# Patient Record
Sex: FEMALE | Race: WHITE | NOT HISPANIC OR LATINO | Employment: FULL TIME | URBAN - METROPOLITAN AREA
[De-identification: names, ages, dates, MRNs, and addresses within clinical notes are randomized per-mention and may not be internally consistent; named-entity substitution may affect disease eponyms.]

---

## 2023-08-04 LAB
ALANINE AMINOTRANSFERASE (SGPT) (U/L) IN SER/PLAS: 12 U/L (ref 7–45)
ALBUMIN (G/DL) IN SER/PLAS: 4 G/DL (ref 3.4–5)
ALKALINE PHOSPHATASE (U/L) IN SER/PLAS: 94 U/L (ref 33–110)
ANION GAP IN SER/PLAS: 12 MMOL/L (ref 10–20)
ASPARTATE AMINOTRANSFERASE (SGOT) (U/L) IN SER/PLAS: 11 U/L (ref 9–39)
BASOPHILS (10*3/UL) IN BLOOD BY AUTOMATED COUNT: 0.06 X10E9/L (ref 0–0.1)
BASOPHILS/100 LEUKOCYTES IN BLOOD BY AUTOMATED COUNT: 0.5 % (ref 0–2)
BILIRUBIN TOTAL (MG/DL) IN SER/PLAS: 0.3 MG/DL (ref 0–1.2)
CALCIUM (MG/DL) IN SER/PLAS: 9.2 MG/DL (ref 8.6–10.3)
CARBON DIOXIDE, TOTAL (MMOL/L) IN SER/PLAS: 29 MMOL/L (ref 21–32)
CHLORIDE (MMOL/L) IN SER/PLAS: 102 MMOL/L (ref 98–107)
CHOLESTEROL (MG/DL) IN SER/PLAS: 198 MG/DL (ref 0–199)
CHOLESTEROL IN HDL (MG/DL) IN SER/PLAS: 32.2 MG/DL
CHOLESTEROL/HDL RATIO: 6.1
CREATININE (MG/DL) IN SER/PLAS: 0.75 MG/DL (ref 0.5–1.05)
EOSINOPHILS (10*3/UL) IN BLOOD BY AUTOMATED COUNT: 0.28 X10E9/L (ref 0–0.7)
EOSINOPHILS/100 LEUKOCYTES IN BLOOD BY AUTOMATED COUNT: 2.2 % (ref 0–6)
ERYTHROCYTE DISTRIBUTION WIDTH (RATIO) BY AUTOMATED COUNT: 12.9 % (ref 11.5–14.5)
ERYTHROCYTE MEAN CORPUSCULAR HEMOGLOBIN CONCENTRATION (G/DL) BY AUTOMATED: 30.9 G/DL (ref 32–36)
ERYTHROCYTE MEAN CORPUSCULAR VOLUME (FL) BY AUTOMATED COUNT: 88 FL (ref 80–100)
ERYTHROCYTES (10*6/UL) IN BLOOD BY AUTOMATED COUNT: 5.02 X10E12/L (ref 4–5.2)
ESTIMATED AVERAGE GLUCOSE FOR HBA1C: 128 MG/DL
GFR FEMALE: >90 ML/MIN/1.73M2
GLUCOSE (MG/DL) IN SER/PLAS: 98 MG/DL (ref 74–99)
HEMATOCRIT (%) IN BLOOD BY AUTOMATED COUNT: 44 % (ref 36–46)
HEMOGLOBIN (G/DL) IN BLOOD: 13.6 G/DL (ref 12–16)
HEMOGLOBIN A1C/HEMOGLOBIN TOTAL IN BLOOD: 6.1 %
IMMATURE GRANULOCYTES/100 LEUKOCYTES IN BLOOD BY AUTOMATED COUNT: 0.4 % (ref 0–0.9)
LDL: 122 MG/DL (ref 0–99)
LEUKOCYTES (10*3/UL) IN BLOOD BY AUTOMATED COUNT: 12.9 X10E9/L (ref 4.4–11.3)
LYMPHOCYTES (10*3/UL) IN BLOOD BY AUTOMATED COUNT: 3.48 X10E9/L (ref 1.2–4.8)
LYMPHOCYTES/100 LEUKOCYTES IN BLOOD BY AUTOMATED COUNT: 27 % (ref 13–44)
MONOCYTES (10*3/UL) IN BLOOD BY AUTOMATED COUNT: 0.82 X10E9/L (ref 0.1–1)
MONOCYTES/100 LEUKOCYTES IN BLOOD BY AUTOMATED COUNT: 6.4 % (ref 2–10)
NEUTROPHILS (10*3/UL) IN BLOOD BY AUTOMATED COUNT: 8.2 X10E9/L (ref 1.2–7.7)
NEUTROPHILS/100 LEUKOCYTES IN BLOOD BY AUTOMATED COUNT: 63.5 % (ref 40–80)
NON HDL CHOLESTEROL: 166 MG/DL
PLATELETS (10*3/UL) IN BLOOD AUTOMATED COUNT: 454 X10E9/L (ref 150–450)
POTASSIUM (MMOL/L) IN SER/PLAS: 4.4 MMOL/L (ref 3.5–5.3)
PROTEIN TOTAL: 7.8 G/DL (ref 6.4–8.2)
SODIUM (MMOL/L) IN SER/PLAS: 139 MMOL/L (ref 136–145)
THYROTROPIN (MIU/L) IN SER/PLAS BY DETECTION LIMIT <= 0.05 MIU/L: 2.6 MIU/L (ref 0.44–3.98)
TRIGLYCERIDE (MG/DL) IN SER/PLAS: 221 MG/DL (ref 0–149)
UREA NITROGEN (MG/DL) IN SER/PLAS: 10 MG/DL (ref 6–23)
VLDL: 44 MG/DL (ref 0–40)

## 2023-12-11 ENCOUNTER — TELEPHONE (OUTPATIENT)
Dept: PRIMARY CARE | Facility: CLINIC | Age: 28
End: 2023-12-11
Payer: COMMERCIAL

## 2023-12-11 NOTE — TELEPHONE ENCOUNTER
Patient can't make it in for appt on 12/15 due to work and would like to switch appt to virtual if at all possible.

## 2023-12-15 ENCOUNTER — APPOINTMENT (OUTPATIENT)
Dept: PRIMARY CARE | Facility: CLINIC | Age: 28
End: 2023-12-15
Payer: COMMERCIAL

## 2024-01-29 ENCOUNTER — OFFICE VISIT (OUTPATIENT)
Dept: PRIMARY CARE | Facility: CLINIC | Age: 29
End: 2024-01-29
Payer: COMMERCIAL

## 2024-01-29 VITALS
TEMPERATURE: 98.4 F | HEIGHT: 62 IN | BODY MASS INDEX: 53.92 KG/M2 | DIASTOLIC BLOOD PRESSURE: 84 MMHG | SYSTOLIC BLOOD PRESSURE: 124 MMHG | WEIGHT: 293 LBS | HEART RATE: 111 BPM

## 2024-01-29 DIAGNOSIS — D72.829 LEUKOCYTOSIS, UNSPECIFIED TYPE: ICD-10-CM

## 2024-01-29 DIAGNOSIS — E66.01 CLASS 3 SEVERE OBESITY DUE TO EXCESS CALORIES WITH SERIOUS COMORBIDITY AND BODY MASS INDEX (BMI) OF 50.0 TO 59.9 IN ADULT (MULTI): ICD-10-CM

## 2024-01-29 DIAGNOSIS — E88.810 METABOLIC SYNDROME: ICD-10-CM

## 2024-01-29 DIAGNOSIS — R73.03 PRE-DIABETES: Primary | ICD-10-CM

## 2024-01-29 PROBLEM — E78.6 LOW LEVEL OF HIGH DENSITY LIPOPROTEIN (HDL): Status: ACTIVE | Noted: 2024-01-29

## 2024-01-29 PROBLEM — J30.9 ALLERGIC RHINITIS: Status: ACTIVE | Noted: 2024-01-29

## 2024-01-29 PROCEDURE — 1036F TOBACCO NON-USER: CPT | Performed by: FAMILY MEDICINE

## 2024-01-29 PROCEDURE — 99213 OFFICE O/P EST LOW 20 MIN: CPT | Performed by: FAMILY MEDICINE

## 2024-01-29 PROCEDURE — 3008F BODY MASS INDEX DOCD: CPT | Performed by: FAMILY MEDICINE

## 2024-01-29 RX ORDER — LEVONORGESTREL 52 MG/1
1 INTRAUTERINE DEVICE INTRAUTERINE ONCE
COMMUNITY

## 2024-01-29 ASSESSMENT — PATIENT HEALTH QUESTIONNAIRE - PHQ9
SUM OF ALL RESPONSES TO PHQ9 QUESTIONS 1 AND 2: 0
1. LITTLE INTEREST OR PLEASURE IN DOING THINGS: NOT AT ALL
2. FEELING DOWN, DEPRESSED OR HOPELESS: NOT AT ALL

## 2024-01-29 NOTE — PROGRESS NOTES
"Subjective   Patient ID: Muriel Carranza is a 28 y.o. female who presents for Follow-up (Follow up).    HPI   Here for follow-up and review of labs from last August.  In the past 1 month, has been making more concerted effort at healthier food choices, monitoring portions.  Getting back into exercise routine.  Motivated to make changes.  No recent BP checks outside of office.  Denies headaches, lightheadedness, dizziness.  Review of Systems   Constitutional:  Negative for fatigue and unexpected weight change.   Respiratory:  Negative for cough, chest tightness and shortness of breath.    Cardiovascular:  Negative for chest pain and palpitations.   Gastrointestinal:  Negative for abdominal pain.   Genitourinary:  Negative for menstrual problem.   Neurological:  Negative for dizziness, light-headedness and headaches.       Objective   /84   Pulse (!) 111   Temp 36.9 °C (98.4 °F)   Ht 1.571 m (5' 1.85\")   Wt 139 kg (305 lb 9.6 oz)   BMI 56.17 kg/m²     Physical Exam  Constitutional:       Appearance: Normal appearance. She is obese.   HENT:      Nose: Nose normal. No congestion or rhinorrhea.      Mouth/Throat:      Mouth: Mucous membranes are moist.      Pharynx: Oropharynx is clear.   Eyes:      Extraocular Movements: Extraocular movements intact.      Conjunctiva/sclera: Conjunctivae normal.      Pupils: Pupils are equal, round, and reactive to light.   Cardiovascular:      Rate and Rhythm: Normal rate and regular rhythm.      Pulses: Normal pulses.      Heart sounds: Normal heart sounds.   Pulmonary:      Effort: Pulmonary effort is normal.      Breath sounds: Normal breath sounds.   Abdominal:      General: Abdomen is flat.      Palpations: Abdomen is soft.      Tenderness: There is no abdominal tenderness.   Musculoskeletal:      Cervical back: Normal range of motion and neck supple.      Right lower leg: No edema.      Left lower leg: No edema.   Lymphadenopathy:      Cervical: No cervical " adenopathy.   Skin:     General: Skin is warm and dry.   Neurological:      Mental Status: She is alert.      2217  Hemoglobin A1C [[id number]]   (Abnormal)   Component Value Units   Hemoglobin A1C 6.1 Abnormal   %   Estimated Average Glucose 128 MG/DL   08/04/2023 2602073208/04/2023 1317Comprehensive Metabolic Panel [[id number]]  Component Value Units   Glucose 98 mg/dL   Sodium 139 mmol/L   Potassium 4.4 mmol/L   Chloride 102 mmol/L   Bicarbonate 29 mmol/L   Anion Gap 12 mmol/L   Urea Nitrogen 10 mg/dL   Creatinine 0.75 mg/dL   GFR Female >90  mL/min/1.73m2   Calcium 9.2 mg/dL   Albumin 4.0 g/dL   Alkaline Phosphatase 94 U/L   Total Protein 7.8 g/dL   AST 11 U/L   Total Bilirubin 0.3 mg/dL   ALT (SGPT) 12  U/L   08/04/2023 [id number]/04/2023 1317  Lipid Panel [[id number]]   (Abnormal)   Component Value Units   Cholesterol 198  mg/dL   HDL 32.2 Abnormal   mg/dL   Cholesterol/HDL Ratio 6.1 Abnormal       High   mg/dL   VLDL 44 High  mg/dL   Triglycerides 221 High   mg/dL   Non HDL Cholesterol 166  mg/dL       Assessment/Plan   Diagnoses and all orders for this visit:  Discussed lifestyle strategies to help improve weight management.  Check labs prior to follow-up in 6 months  Pre-diabetes  -     Basic Metabolic Panel; Future  -     Hemoglobin A1C; Future  Metabolic syndrome  Leukocytosis, unspecified type  -     CBC and Auto Differential; Future  Class 3 severe obesity due to excess calories with serious comorbidity and body mass index (BMI) of 50.0 to 59.9 in adult (CMS/[facility])

## 2024-01-30 PROBLEM — E66.01 CLASS 3 SEVERE OBESITY WITH BODY MASS INDEX (BMI) OF 50.0 TO 59.9 IN ADULT (MULTI): Status: ACTIVE | Noted: 2024-01-30

## 2024-01-30 PROBLEM — Z97.5 IUD CONTRACEPTION: Status: ACTIVE | Noted: 2024-01-30

## 2024-01-30 PROBLEM — E66.813 CLASS 3 SEVERE OBESITY WITH BODY MASS INDEX (BMI) OF 50.0 TO 59.9 IN ADULT: Status: ACTIVE | Noted: 2024-01-30

## 2024-01-30 ASSESSMENT — ENCOUNTER SYMPTOMS
ABDOMINAL PAIN: 0
PALPITATIONS: 0
HEADACHES: 0
DIZZINESS: 0
CHEST TIGHTNESS: 0
FATIGUE: 0
LIGHT-HEADEDNESS: 0
COUGH: 0
SHORTNESS OF BREATH: 0
UNEXPECTED WEIGHT CHANGE: 0

## 2024-04-02 ENCOUNTER — TELEMEDICINE (OUTPATIENT)
Dept: PRIMARY CARE | Facility: CLINIC | Age: 29
End: 2024-04-02
Payer: COMMERCIAL

## 2024-04-02 ENCOUNTER — TELEPHONE (OUTPATIENT)
Dept: PRIMARY CARE | Facility: CLINIC | Age: 29
End: 2024-04-02

## 2024-04-02 DIAGNOSIS — J01.90 ACUTE NON-RECURRENT SINUSITIS, UNSPECIFIED LOCATION: Primary | ICD-10-CM

## 2024-04-02 PROCEDURE — 3008F BODY MASS INDEX DOCD: CPT | Performed by: INTERNAL MEDICINE

## 2024-04-02 PROCEDURE — 99422 OL DIG E/M SVC 11-20 MIN: CPT | Performed by: INTERNAL MEDICINE

## 2024-04-02 PROCEDURE — 1036F TOBACCO NON-USER: CPT | Performed by: INTERNAL MEDICINE

## 2024-04-02 RX ORDER — AMOXICILLIN AND CLAVULANATE POTASSIUM 600; 42.9 MG/5ML; MG/5ML
600 POWDER, FOR SUSPENSION ORAL EVERY 12 HOURS SCHEDULED
Qty: 100 ML | Refills: 0 | Status: SHIPPED | OUTPATIENT
Start: 2024-04-02 | End: 2024-04-12

## 2024-04-02 RX ORDER — AMOXICILLIN AND CLAVULANATE POTASSIUM 875; 125 MG/1; MG/1
875 TABLET, FILM COATED ORAL 2 TIMES DAILY
Qty: 20 TABLET | Refills: 0 | Status: SHIPPED | OUTPATIENT
Start: 2024-04-02 | End: 2024-04-02 | Stop reason: ALTCHOICE

## 2024-04-02 ASSESSMENT — PATIENT HEALTH QUESTIONNAIRE - PHQ9
SUM OF ALL RESPONSES TO PHQ9 QUESTIONS 1 AND 2: 0
2. FEELING DOWN, DEPRESSED OR HOPELESS: NOT AT ALL
1. LITTLE INTEREST OR PLEASURE IN DOING THINGS: NOT AT ALL

## 2024-04-02 NOTE — PROGRESS NOTES
"Subjective   Patient ID: Muriel Carranza is a 28 y.o. female who presents for Nasal Congestion (Nasal congestion, \"scratchy\" throat, fevers x 4 days/).    HPI Pt is a pleasant 28 y.o. CF who was seen and evaluated during the VV. Pt states that she feels sick since 3/30/2024. Pt states that she has scratchy throat and congested sinuses as well as low grade fever as the initial sxs. Pt states that the fever broke on 4/1/2024 and now she has a lot of colored nasal discharge and sinuses pain and heaviness. Pt did home COVID test and it was negative for the infection.   During the clinical encounter pt denies fever, chills, no SOB, no chest pain/tightness, no abdominal pain, no N/V/D reported, no change of urination reported. Pt denies any other health concerns during this visit.  Sohx: reviewed  PMHx and Fhx: reviewed    Review of Systems  12 Systems have been reviewed as follows:   Constitutional: no chills  Eyes: no eyesight problems, no eye redness, no eye pain, no blurred vision  ENT: no ear pain, no hearing loss, but as mentioned at HPI  Cardiovascular: no chest pain or tightness, no SOB, the heart rate was not fast or slow  Respiratory: no cough, no dyspnea with exertion or with rest, no wheezing  Gastrointestinal: no abdominal pain, no constipation or diarrhea, no heartburn, no nausea or vomiting  Genitourinary: no urine frequency, no dysuria, no urinary urgency, no urinary incontinence or retention  Musculoskeletal: no back pain, no arthralgia, no joint swelling or stiffness, no muscle weakness  Integumentary: no skin lesions or rash  Neurological: no headaches, no dizziness or fainting, no limb weakness  Psychiatric: no mood changes, no anxiety or depression, no SI/HI  Endocrine: no weight change, no temperature intolerance, no change of appetite  Hematologic/Lymphatic: no easy bruising or bleeding  Objective   There were no vitals taken for this visit.    Physical Exam  PE was limited due to the virtual " settings of the visit  Pt appears comfortable, not in acute distress  Pt is Ax0x3, pleasant, follows the commands  Skin color is normal, clear conjunctiva  Breathing unlabored  No neck lymphadenopathy noticed   Vitals: were not provided    Assessment/Plan   Acute sinusitis:  Hx as mentioned above  Will start on amox/clav 875/125 mg PO BID x 10 days. Pt denies any chance of pregnancy  Pt could use tylenol for pain/fever management  Pt was instructed to contact PCP if pt will have no improvement of the condition/worsening of the sxs/new sxs on the current treatment  Plan was d/c with the pt in details, verbalized understanding, agreed  Please follow up with PCP as planned or sooner if needed     An interactive audio and video telecommunication system which permits real time communications between the patient (at the originating site) and provider (at the distant site) was utilized to provide this telehealth service.  Verbal consent was requested and obtained from the patient  during the telehealth visit.

## 2024-07-29 ENCOUNTER — LAB (OUTPATIENT)
Dept: LAB | Facility: LAB | Age: 29
End: 2024-07-29
Payer: COMMERCIAL

## 2024-07-29 DIAGNOSIS — D72.829 LEUKOCYTOSIS, UNSPECIFIED TYPE: ICD-10-CM

## 2024-07-29 DIAGNOSIS — R73.03 PRE-DIABETES: ICD-10-CM

## 2024-07-29 LAB
ANION GAP SERPL CALC-SCNC: 14 MMOL/L (ref 10–20)
BASOPHILS # BLD AUTO: 0.06 X10*3/UL (ref 0–0.1)
BASOPHILS NFR BLD AUTO: 0.4 %
BUN SERPL-MCNC: 11 MG/DL (ref 6–23)
CALCIUM SERPL-MCNC: 9.1 MG/DL (ref 8.6–10.3)
CHLORIDE SERPL-SCNC: 102 MMOL/L (ref 98–107)
CO2 SERPL-SCNC: 25 MMOL/L (ref 21–32)
CREAT SERPL-MCNC: 0.76 MG/DL (ref 0.5–1.05)
EGFRCR SERPLBLD CKD-EPI 2021: >90 ML/MIN/1.73M*2
EOSINOPHIL # BLD AUTO: 0.31 X10*3/UL (ref 0–0.7)
EOSINOPHIL NFR BLD AUTO: 2 %
ERYTHROCYTE [DISTWIDTH] IN BLOOD BY AUTOMATED COUNT: 13 % (ref 11.5–14.5)
GLUCOSE SERPL-MCNC: 86 MG/DL (ref 74–99)
HCT VFR BLD AUTO: 41.3 % (ref 36–46)
HGB BLD-MCNC: 12.9 G/DL (ref 12–16)
IMM GRANULOCYTES # BLD AUTO: 0.06 X10*3/UL (ref 0–0.7)
IMM GRANULOCYTES NFR BLD AUTO: 0.4 % (ref 0–0.9)
LYMPHOCYTES # BLD AUTO: 4.32 X10*3/UL (ref 1.2–4.8)
LYMPHOCYTES NFR BLD AUTO: 27.5 %
MCH RBC QN AUTO: 27 PG (ref 26–34)
MCHC RBC AUTO-ENTMCNC: 31.2 G/DL (ref 32–36)
MCV RBC AUTO: 87 FL (ref 80–100)
MONOCYTES # BLD AUTO: 1.31 X10*3/UL (ref 0.1–1)
MONOCYTES NFR BLD AUTO: 8.3 %
NEUTROPHILS # BLD AUTO: 9.65 X10*3/UL (ref 1.2–7.7)
NEUTROPHILS NFR BLD AUTO: 61.4 %
NRBC BLD-RTO: 0 /100 WBCS (ref 0–0)
PLATELET # BLD AUTO: 467 X10*3/UL (ref 150–450)
POTASSIUM SERPL-SCNC: 4.2 MMOL/L (ref 3.5–5.3)
RBC # BLD AUTO: 4.77 X10*6/UL (ref 4–5.2)
SODIUM SERPL-SCNC: 137 MMOL/L (ref 136–145)
WBC # BLD AUTO: 15.7 X10*3/UL (ref 4.4–11.3)

## 2024-07-29 PROCEDURE — 85025 COMPLETE CBC W/AUTO DIFF WBC: CPT

## 2024-07-29 PROCEDURE — 80048 BASIC METABOLIC PNL TOTAL CA: CPT

## 2024-07-29 PROCEDURE — 36415 COLL VENOUS BLD VENIPUNCTURE: CPT

## 2024-07-29 PROCEDURE — 83036 HEMOGLOBIN GLYCOSYLATED A1C: CPT

## 2024-07-30 LAB
EST. AVERAGE GLUCOSE BLD GHB EST-MCNC: 123 MG/DL
HBA1C MFR BLD: 5.9 %

## 2024-07-31 ENCOUNTER — APPOINTMENT (OUTPATIENT)
Dept: PRIMARY CARE | Facility: CLINIC | Age: 29
End: 2024-07-31
Payer: COMMERCIAL

## 2024-07-31 VITALS
SYSTOLIC BLOOD PRESSURE: 124 MMHG | HEART RATE: 92 BPM | WEIGHT: 293 LBS | HEIGHT: 61 IN | DIASTOLIC BLOOD PRESSURE: 82 MMHG | BODY MASS INDEX: 55.32 KG/M2 | TEMPERATURE: 98.6 F

## 2024-07-31 DIAGNOSIS — Z00.00 ADULT GENERAL MEDICAL EXAM: Primary | ICD-10-CM

## 2024-07-31 DIAGNOSIS — R73.03 PRE-DIABETES: ICD-10-CM

## 2024-07-31 DIAGNOSIS — D72.829 LEUKOCYTOSIS, UNSPECIFIED TYPE: ICD-10-CM

## 2024-07-31 DIAGNOSIS — E88.810 METABOLIC SYNDROME: ICD-10-CM

## 2024-07-31 DIAGNOSIS — E66.01 CLASS 3 SEVERE OBESITY DUE TO EXCESS CALORIES WITH SERIOUS COMORBIDITY AND BODY MASS INDEX (BMI) OF 50.0 TO 59.9 IN ADULT (MULTI): ICD-10-CM

## 2024-07-31 ASSESSMENT — ENCOUNTER SYMPTOMS
BLOOD IN STOOL: 0
UNEXPECTED WEIGHT CHANGE: 0
HEADACHES: 0
SHORTNESS OF BREATH: 0
NERVOUS/ANXIOUS: 0
FATIGUE: 0
COUGH: 0
PALPITATIONS: 0
DIZZINESS: 0
CONSTIPATION: 0
LIGHT-HEADEDNESS: 0
CHEST TIGHTNESS: 0
SLEEP DISTURBANCE: 0
ABDOMINAL PAIN: 0
DIARRHEA: 0
DYSPHORIC MOOD: 0

## 2024-07-31 ASSESSMENT — PATIENT HEALTH QUESTIONNAIRE - PHQ9
2. FEELING DOWN, DEPRESSED OR HOPELESS: NOT AT ALL
SUM OF ALL RESPONSES TO PHQ9 QUESTIONS 1 AND 2: 0
1. LITTLE INTEREST OR PLEASURE IN DOING THINGS: NOT AT ALL

## 2024-07-31 NOTE — PROGRESS NOTES
"Subjective   Patient ID: Muriel Carranza is a 29 y.o. female who presents for Follow-up (6m fuv - no questions/concerns at this time).    HPI   Here for annual well exam and follow-up and lab review.  Obesity-trying to improve diet.  Trying to decrease the frequency of meals that she eats away from home.  Has been doing meal prep to minimize takeout lunches.  Still consistent with exercise.  PreDM-denies any polyuria, polyphagia, polydipsia.  3.  Leukocytosis-denies any fevers, chills, night sweats.  No recent illness.  Needs to reschedule her hematology follow-up.    Nutrition: increasing veggies.  Still eating out, fast food, but trying to meal prep for lunches.  Water, no caffeine, occas lemonade  Exercise: 4-5 days per week, exercise video.   Sleep: no issues, 7-8 hrs. per night  Eye: 1 yr ago, glasses but looking into LASIK  Dental:  UTD  Has IUD in place.  Not planning on pregnancy in the near future.    Review of Systems   Constitutional:  Negative for fatigue and unexpected weight change.   Respiratory:  Negative for cough, chest tightness and shortness of breath.    Cardiovascular:  Negative for palpitations and leg swelling.   Gastrointestinal:  Negative for abdominal pain, blood in stool, constipation and diarrhea.   Genitourinary:  Negative for pelvic pain and vaginal bleeding.   Neurological:  Negative for dizziness, light-headedness and headaches.   Psychiatric/Behavioral:  Negative for dysphoric mood and sleep disturbance. The patient is not nervous/anxious.        Objective   /82 (BP Location: Left arm, Patient Position: Sitting, BP Cuff Size: Large adult)   Pulse 92   Temp 37 °C (98.6 °F)   Ht 1.549 m (5' 1\")   Wt 138 kg (304 lb)   BMI 57.44 kg/m²     Physical Exam  Vitals reviewed.   Constitutional:       General: She is not in acute distress.     Appearance: Normal appearance. She is obese.   HENT:      Head: Normocephalic and atraumatic.      Right Ear: Tympanic membrane and ear " canal normal.      Left Ear: Tympanic membrane and ear canal normal.      Nose: Nose normal. No congestion or rhinorrhea.      Mouth/Throat:      Mouth: Mucous membranes are moist.      Pharynx: Oropharynx is clear.   Eyes:      Extraocular Movements: Extraocular movements intact.      Conjunctiva/sclera: Conjunctivae normal.      Pupils: Pupils are equal, round, and reactive to light.   Cardiovascular:      Rate and Rhythm: Normal rate and regular rhythm.      Pulses: Normal pulses.      Heart sounds: Normal heart sounds. No murmur heard.  Pulmonary:      Effort: Pulmonary effort is normal. No respiratory distress.      Breath sounds: Normal breath sounds.   Abdominal:      General: Abdomen is flat. Bowel sounds are normal.      Palpations: Abdomen is soft.      Tenderness: There is no abdominal tenderness.   Musculoskeletal:         General: Normal range of motion.      Cervical back: Normal range of motion and neck supple.      Right lower leg: No edema.      Left lower leg: No edema.   Lymphadenopathy:      Cervical: No cervical adenopathy.   Skin:     General: Skin is warm and dry.   Neurological:      General: No focal deficit present.      Mental Status: She is alert and oriented to person, place, and time.   Psychiatric:         Mood and Affect: Mood normal.         Thought Content: Thought content normal.       Lab Results   Component Value Date    WBC 15.7 (H) 07/29/2024    HGB 12.9 07/29/2024    HCT 41.3 07/29/2024     (H) 07/29/2024    CHOL 198 08/04/2023    TRIG 221 (H) 08/04/2023    HDL 32.2 (A) 08/04/2023    ALT 12 08/04/2023    AST 11 08/04/2023     07/29/2024    K 4.2 07/29/2024     07/29/2024    CREATININE 0.76 07/29/2024    BUN 11 07/29/2024    CO2 25 07/29/2024    TSH 2.60 08/04/2023    HGBA1C 5.9 (H) 07/29/2024     par      Assessment/Plan   Diagnoses and all orders for this visit:  Adult general medical exam  Encourage continued efforts at improving diet, monitoring portions,  continue regular physical activity  Recommend seasonal flu vaccine and COVID booster this fall  Pre-diabetes  Discussed use of low-dose metformin to help with glucose metabolism and potential benefits and weight management.  She declined medication at present.  Continue to monitor A1c  -     Hemoglobin A1C; Future  -     Lipid Panel; Future  Metabolic syndrome  -     CBC and Auto Differential; Future  -     Comprehensive Metabolic Panel; Future  -     Lipid Panel; Future  Class 3 severe obesity due to excess calories with serious comorbidity and body mass index (BMI) of 50.0 to 59.9 in adult (Multi)  Discussed pharmaceutical options to help with weight management including but not limited to GLP-1 agonist therapy, metformin, Qsymia, Contrave.  We discussed saving plan programs for some these medications.  She does not wish to pursue pharmacotherapy at present nor does she wish to follow-up with nutrition at present.  Will continue on own trying to make diet lifestyle modifications.  Leukocytosis, unspecified type  Reviewed recent CBC, encouraged to reschedule her hematology follow-up for routine surveillance  Other orders  -     Follow Up In Primary Care - Established; Future

## 2024-08-13 ENCOUNTER — APPOINTMENT (OUTPATIENT)
Dept: PRIMARY CARE | Facility: CLINIC | Age: 29
End: 2024-08-13
Payer: COMMERCIAL

## 2024-08-23 ENCOUNTER — APPOINTMENT (OUTPATIENT)
Dept: HEMATOLOGY/ONCOLOGY | Facility: CLINIC | Age: 29
End: 2024-08-23

## 2024-09-25 DIAGNOSIS — D72.829 LEUKOCYTOSIS, UNSPECIFIED TYPE: ICD-10-CM

## 2024-09-27 ENCOUNTER — LAB (OUTPATIENT)
Dept: LAB | Facility: CLINIC | Age: 29
End: 2024-09-27
Payer: COMMERCIAL

## 2024-09-27 DIAGNOSIS — D72.829 LEUKOCYTOSIS, UNSPECIFIED TYPE: ICD-10-CM

## 2024-09-27 LAB
BASOPHILS # BLD AUTO: 0.05 X10*3/UL (ref 0–0.1)
BASOPHILS NFR BLD AUTO: 0.3 %
EOSINOPHIL # BLD AUTO: 0.26 X10*3/UL (ref 0–0.7)
EOSINOPHIL NFR BLD AUTO: 1.5 %
ERYTHROCYTE [DISTWIDTH] IN BLOOD BY AUTOMATED COUNT: 13.4 % (ref 11.5–14.5)
FERRITIN SERPL-MCNC: 94 NG/ML (ref 8–150)
HCT VFR BLD AUTO: 42.3 % (ref 36–46)
HGB BLD-MCNC: 13.3 G/DL (ref 12–16)
IMM GRANULOCYTES # BLD AUTO: 0.07 X10*3/UL (ref 0–0.7)
IMM GRANULOCYTES NFR BLD AUTO: 0.4 % (ref 0–0.9)
IRON SATN MFR SERPL: 13 % (ref 25–45)
IRON SERPL-MCNC: 42 UG/DL (ref 35–150)
LYMPHOCYTES # BLD AUTO: 4.5 X10*3/UL (ref 1.2–4.8)
LYMPHOCYTES NFR BLD AUTO: 25.4 %
MCH RBC QN AUTO: 27 PG (ref 26–34)
MCHC RBC AUTO-ENTMCNC: 31.4 G/DL (ref 32–36)
MCV RBC AUTO: 86 FL (ref 80–100)
MONOCYTES # BLD AUTO: 1.22 X10*3/UL (ref 0.1–1)
MONOCYTES NFR BLD AUTO: 6.9 %
NEUTROPHILS # BLD AUTO: 11.63 X10*3/UL (ref 1.2–7.7)
NEUTROPHILS NFR BLD AUTO: 65.5 %
PLATELET # BLD AUTO: 427 X10*3/UL (ref 150–450)
RBC # BLD AUTO: 4.92 X10*6/UL (ref 4–5.2)
TIBC SERPL-MCNC: 314 UG/DL (ref 240–445)
UIBC SERPL-MCNC: 272 UG/DL (ref 110–370)
WBC # BLD AUTO: 17.7 X10*3/UL (ref 4.4–11.3)

## 2024-09-27 PROCEDURE — 83540 ASSAY OF IRON: CPT

## 2024-09-27 PROCEDURE — 85025 COMPLETE CBC W/AUTO DIFF WBC: CPT

## 2024-09-27 PROCEDURE — 82728 ASSAY OF FERRITIN: CPT

## 2024-09-27 PROCEDURE — 36415 COLL VENOUS BLD VENIPUNCTURE: CPT

## 2024-10-01 ENCOUNTER — OFFICE VISIT (OUTPATIENT)
Dept: HEMATOLOGY/ONCOLOGY | Facility: CLINIC | Age: 29
End: 2024-10-01
Payer: COMMERCIAL

## 2024-10-01 VITALS
HEART RATE: 100 BPM | WEIGHT: 293 LBS | SYSTOLIC BLOOD PRESSURE: 157 MMHG | TEMPERATURE: 97.7 F | RESPIRATION RATE: 16 BRPM | OXYGEN SATURATION: 96 % | DIASTOLIC BLOOD PRESSURE: 87 MMHG | BODY MASS INDEX: 57.19 KG/M2

## 2024-10-01 DIAGNOSIS — R73.03 PRE-DIABETES: ICD-10-CM

## 2024-10-01 DIAGNOSIS — E66.813 CLASS 3 SEVERE OBESITY DUE TO EXCESS CALORIES WITH SERIOUS COMORBIDITY AND BODY MASS INDEX (BMI) OF 50.0 TO 59.9 IN ADULT: ICD-10-CM

## 2024-10-01 DIAGNOSIS — E66.01 CLASS 3 SEVERE OBESITY DUE TO EXCESS CALORIES WITH SERIOUS COMORBIDITY AND BODY MASS INDEX (BMI) OF 50.0 TO 59.9 IN ADULT: ICD-10-CM

## 2024-10-01 DIAGNOSIS — D72.829 LEUKOCYTOSIS, UNSPECIFIED TYPE: Primary | ICD-10-CM

## 2024-10-01 DIAGNOSIS — N92.4 EXCESSIVE BLEEDING IN PREMENOPAUSAL PERIOD: ICD-10-CM

## 2024-10-01 PROCEDURE — 99214 OFFICE O/P EST MOD 30 MIN: CPT | Performed by: INTERNAL MEDICINE

## 2024-10-01 ASSESSMENT — PAIN SCALES - GENERAL: PAINLEVEL: 0-NO PAIN

## 2024-10-06 PROBLEM — N92.4 EXCESSIVE BLEEDING IN PREMENOPAUSAL PERIOD: Status: ACTIVE | Noted: 2024-10-06

## 2024-10-06 ASSESSMENT — ENCOUNTER SYMPTOMS
MUSCULOSKELETAL NEGATIVE: 1
CONSTITUTIONAL NEGATIVE: 1
ENDOCRINE NEGATIVE: 1
RESPIRATORY NEGATIVE: 1
EYES NEGATIVE: 1
NEUROLOGICAL NEGATIVE: 1
PSYCHIATRIC NEGATIVE: 1
CARDIOVASCULAR NEGATIVE: 1
HEMATOLOGIC/LYMPHATIC NEGATIVE: 1
GASTROINTESTINAL NEGATIVE: 1

## 2024-10-06 NOTE — PROGRESS NOTES
Patient ID: Muriel Carranza is a 29 y.o. female.  Referring Physician: No referring provider defined for this encounter.  Primary Care Provider: Betty King MD  Visit Type: Follow Up      Subjective    HPI How are my blood counts?    Review of Systems   Constitutional: Negative.    HENT:  Negative.     Eyes: Negative.    Respiratory: Negative.     Cardiovascular: Negative.    Gastrointestinal: Negative.    Endocrine: Negative.    Genitourinary: Negative.     Musculoskeletal: Negative.    Skin: Negative.    Neurological: Negative.    Hematological: Negative.    Psychiatric/Behavioral: Negative.          Objective   BSA: 2.43 meters squared  /87   Pulse 100   Temp 36.5 °C (97.7 °F)   Resp 16   Wt 137 kg (302 lb 11.1 oz)   SpO2 96%   BMI 57.19 kg/m²      has a past medical history of Allergic (03/30/24) and Encounter for insertion of intrauterine contraceptive device.   has a past surgical history that includes Other surgical history (12/20/2021).  Family History   Problem Relation Name Age of Onset    Diabetes type II Mother Mckenzie Carranza     Hypertension Mother Mckenzie Carranza     Other (brain tumor) Father Amado Carranza     Hypertension Father Amado Carranza     Breast cancer Paternal Grandmother Crystal Carranza 70     Oncology History    No history exists.       Muriel Carranza  reports that she has never smoked. She has never used smokeless tobacco.  She  reports current alcohol use.  She  reports no history of drug use.    Physical Exam  Vitals reviewed.   Constitutional:       Appearance: Normal appearance.   HENT:      Head: Normocephalic.      Mouth/Throat:      Mouth: Mucous membranes are moist.   Eyes:      Extraocular Movements: Extraocular movements intact.      Pupils: Pupils are equal, round, and reactive to light.   Cardiovascular:      Rate and Rhythm: Normal rate and regular rhythm.      Pulses: Normal pulses.      Heart sounds: Normal heart sounds.   Pulmonary:       Effort: Pulmonary effort is normal.      Breath sounds: Normal breath sounds.   Abdominal:      General: Bowel sounds are normal.      Palpations: Abdomen is soft.   Musculoskeletal:         General: Normal range of motion.      Cervical back: Normal range of motion and neck supple.   Skin:     General: Skin is warm.   Neurological:      General: No focal deficit present.      Mental Status: She is alert and oriented to person, place, and time.   Psychiatric:         Mood and Affect: Mood normal.         Behavior: Behavior normal.         WBC   Date/Time Value Ref Range Status   09/27/2024 03:37 PM 17.7 (H) 4.4 - 11.3 x10*3/uL Final   07/29/2024 03:33 PM 15.7 (H) 4.4 - 11.3 x10*3/uL Final   08/04/2023 09:32 AM 12.9 (H) 4.4 - 11.3 x10E9/L Final   02/17/2023 03:02 PM 14.6 (H) 4.4 - 11.3 x10E9/L Final   07/15/2022 03:00 PM 16.0 (H) 4.4 - 11.3 x10E9/L Final     nRBC   Date Value Ref Range Status   07/29/2024 0.0 0.0 - 0.0 /100 WBCs Final   12/22/2021 0.0 0.0 - 0.0 /100 WBC Final     RBC   Date Value Ref Range Status   09/27/2024 4.92 4.00 - 5.20 x10*6/uL Final   07/29/2024 4.77 4.00 - 5.20 x10*6/uL Final   08/04/2023 5.02 4.00 - 5.20 x10E12/L Final   02/17/2023 4.72 4.00 - 5.20 x10E12/L Final   07/15/2022 4.71 4.00 - 5.20 x10E12/L Final     Hemoglobin   Date Value Ref Range Status   09/27/2024 13.3 12.0 - 16.0 g/dL Final   07/29/2024 12.9 12.0 - 16.0 g/dL Final   08/04/2023 13.6 12.0 - 16.0 g/dL Final   02/17/2023 13.1 12.0 - 16.0 g/dL Final   07/15/2022 13.2 12.0 - 16.0 g/dL Final     Hematocrit   Date Value Ref Range Status   09/27/2024 42.3 36.0 - 46.0 % Final   07/29/2024 41.3 36.0 - 46.0 % Final   08/04/2023 44.0 36.0 - 46.0 % Final   02/17/2023 41.4 36.0 - 46.0 % Final   07/15/2022 40.5 36.0 - 46.0 % Final     MCV   Date/Time Value Ref Range Status   09/27/2024 03:37 PM 86 80 - 100 fL Final   07/29/2024 03:33 PM 87 80 - 100 fL Final   08/04/2023 09:32 AM 88 80 - 100 fL Final   02/17/2023 03:02 PM 88 80 - 100 fL  "Final   07/15/2022 03:00 PM 86 80 - 100 fL Final     MCH   Date/Time Value Ref Range Status   09/27/2024 03:37 PM 27.0 26.0 - 34.0 pg Final   07/29/2024 03:33 PM 27.0 26.0 - 34.0 pg Final     MCHC   Date/Time Value Ref Range Status   09/27/2024 03:37 PM 31.4 (L) 32.0 - 36.0 g/dL Final   07/29/2024 03:33 PM 31.2 (L) 32.0 - 36.0 g/dL Final   08/04/2023 09:32 AM 30.9 (L) 32.0 - 36.0 g/dL Final   02/17/2023 03:02 PM 31.6 (L) 32.0 - 36.0 g/dL Final   07/15/2022 03:00 PM 32.6 32.0 - 36.0 g/dL Final     RDW   Date/Time Value Ref Range Status   09/27/2024 03:37 PM 13.4 11.5 - 14.5 % Final   07/29/2024 03:33 PM 13.0 11.5 - 14.5 % Final   08/04/2023 09:32 AM 12.9 11.5 - 14.5 % Final   02/17/2023 03:02 PM 13.0 11.5 - 14.5 % Final   07/15/2022 03:00 PM 12.9 11.5 - 14.5 % Final     Platelets   Date/Time Value Ref Range Status   09/27/2024 03:37  150 - 450 x10*3/uL Final   07/29/2024 03:33  (H) 150 - 450 x10*3/uL Final   08/04/2023 09:32  (H) 150 - 450 x10E9/L Final   02/17/2023 03:02  (H) 150 - 450 x10E9/L Final   07/15/2022 03:00  150 - 450 x10E9/L Final     No results found for: \"MPV\"  Neutrophils %   Date/Time Value Ref Range Status   09/27/2024 03:37 PM 65.5 40.0 - 80.0 % Final   07/29/2024 03:33 PM 61.4 40.0 - 80.0 % Final   08/04/2023 09:32 AM 63.5 40.0 - 80.0 % Final   02/17/2023 03:02 PM 61.3 40.0 - 80.0 % Final   07/15/2022 03:00 PM 63.6 40.0 - 80.0 % Final     Immature Granulocytes %, Automated   Date/Time Value Ref Range Status   09/27/2024 03:37 PM 0.4 0.0 - 0.9 % Final     Comment:     Immature Granulocyte Count (IG) includes promyelocytes, myelocytes and metamyelocytes but does not include bands. Percent differential counts (%) should be interpreted in the context of the absolute cell counts (cells/UL).   07/29/2024 03:33 PM 0.4 0.0 - 0.9 % Final     Comment:     Immature Granulocyte Count (IG) includes promyelocytes, myelocytes and metamyelocytes but does not include bands. Percent " differential counts (%) should be interpreted in the context of the absolute cell counts (cells/UL).   08/04/2023 09:32 AM 0.4 0.0 - 0.9 % Final     Comment:      Immature Granulocyte Count (IG) includes promyelocytes,    myelocytes and metamyelocytes but does not include bands.   Percent differential counts (%) should be interpreted in the   context of the absolute cell counts (cells/L).     02/17/2023 03:02 PM 0.3 0.0 - 0.9 % Final     Comment:      Immature Granulocyte Count (IG) includes promyelocytes,    myelocytes and metamyelocytes but does not include bands.   Percent differential counts (%) should be interpreted in the   context of the absolute cell counts (cells/L).     07/15/2022 03:00 PM 0.2 0.0 - 0.9 % Final     Comment:      Immature Granulocyte Count (IG) includes promyelocytes,    myelocytes and metamyelocytes but does not include bands.   Percent differential counts (%) should be interpreted in the   context of the absolute cell counts (cells/L).       Lymphocytes %   Date/Time Value Ref Range Status   09/27/2024 03:37 PM 25.4 13.0 - 44.0 % Final   07/29/2024 03:33 PM 27.5 13.0 - 44.0 % Final   08/04/2023 09:32 AM 27.0 13.0 - 44.0 % Final   02/17/2023 03:02 PM 28.9 13.0 - 44.0 % Final   07/15/2022 03:00 PM 26.0 13.0 - 44.0 % Final     Monocytes %   Date/Time Value Ref Range Status   09/27/2024 03:37 PM 6.9 2.0 - 10.0 % Final   07/29/2024 03:33 PM 8.3 2.0 - 10.0 % Final   08/04/2023 09:32 AM 6.4 2.0 - 10.0 % Final   02/17/2023 03:02 PM 7.1 2.0 - 10.0 % Final   07/15/2022 03:00 PM 8.4 2.0 - 10.0 % Final     Eosinophils %   Date/Time Value Ref Range Status   09/27/2024 03:37 PM 1.5 0.0 - 6.0 % Final   07/29/2024 03:33 PM 2.0 0.0 - 6.0 % Final   08/04/2023 09:32 AM 2.2 0.0 - 6.0 % Final   02/17/2023 03:02 PM 2.1 0.0 - 6.0 % Final   07/15/2022 03:00 PM 1.5 0.0 - 6.0 % Final     Basophils %   Date/Time Value Ref Range Status   09/27/2024 03:37 PM 0.3 0.0 - 2.0 % Final   07/29/2024 03:33 PM 0.4 0.0 - 2.0 %  Final   08/04/2023 09:32 AM 0.5 0.0 - 2.0 % Final   02/17/2023 03:02 PM 0.3 0.0 - 2.0 % Final   07/15/2022 03:00 PM 0.3 0.0 - 2.0 % Final     Neutrophils Absolute   Date/Time Value Ref Range Status   09/27/2024 03:37 PM 11.63 (H) 1.20 - 7.70 x10*3/uL Final     Comment:     Percent differential counts (%) should be interpreted in the context of the absolute cell counts (cells/uL).   07/29/2024 03:33 PM 9.65 (H) 1.20 - 7.70 x10*3/uL Final     Comment:     Percent differential counts (%) should be interpreted in the context of the absolute cell counts (cells/uL).   08/04/2023 09:32 AM 8.20 (H) 1.20 - 7.70 x10E9/L Final   02/17/2023 03:02 PM 8.92 (H) 1.20 - 7.70 x10E9/L Final   07/15/2022 03:00 PM 10.24 (H) 1.20 - 7.70 x10E9/L Final     Immature Granulocytes Absolute, Automated   Date/Time Value Ref Range Status   09/27/2024 03:37 PM 0.07 0.00 - 0.70 x10*3/uL Final   07/29/2024 03:33 PM 0.06 0.00 - 0.70 x10*3/uL Final     Lymphocytes Absolute   Date/Time Value Ref Range Status   09/27/2024 03:37 PM 4.50 1.20 - 4.80 x10*3/uL Final   07/29/2024 03:33 PM 4.32 1.20 - 4.80 x10*3/uL Final   08/04/2023 09:32 AM 3.48 1.20 - 4.80 x10E9/L Final   02/17/2023 03:02 PM 4.22 1.20 - 4.80 x10E9/L Final   07/15/2022 03:00 PM 4.20 1.20 - 4.80 x10E9/L Final     Monocytes Absolute   Date/Time Value Ref Range Status   09/27/2024 03:37 PM 1.22 (H) 0.10 - 1.00 x10*3/uL Final   07/29/2024 03:33 PM 1.31 (H) 0.10 - 1.00 x10*3/uL Final   08/04/2023 09:32 AM 0.82 0.10 - 1.00 x10E9/L Final   02/17/2023 03:02 PM 1.04 (H) 0.10 - 1.00 x10E9/L Final   07/15/2022 03:00 PM 1.36 (H) 0.10 - 1.00 x10E9/L Final     Eosinophils Absolute   Date/Time Value Ref Range Status   09/27/2024 03:37 PM 0.26 0.00 - 0.70 x10*3/uL Final   07/29/2024 03:33 PM 0.31 0.00 - 0.70 x10*3/uL Final   08/04/2023 09:32 AM 0.28 0.00 - 0.70 x10E9/L Final   02/17/2023 03:02 PM 0.30 0.00 - 0.70 x10E9/L Final   07/15/2022 03:00 PM 0.24 0.00 - 0.70 x10E9/L Final     Basophils Absolute  "  Date/Time Value Ref Range Status   09/27/2024 03:37 PM 0.05 0.00 - 0.10 x10*3/uL Final   07/29/2024 03:33 PM 0.06 0.00 - 0.10 x10*3/uL Final   08/04/2023 09:32 AM 0.06 0.00 - 0.10 x10E9/L Final   02/17/2023 03:02 PM 0.05 0.00 - 0.10 x10E9/L Final   07/15/2022 03:00 PM 0.05 0.00 - 0.10 x10E9/L Final     Comment:     Automated WBC differential has been confirmed by manual smear.       No components found for: \"PT\"  No results found for: \"APTT\"  Medication Documentation Review Audit       Reviewed by Betty King MD (Physician) on 07/31/24 at 1650      Medication Order Taking? Sig Documenting Provider Last Dose Status   levonorgestrel (Mirena) 21 mcg/24 hours (8 yrs) 52 mg IUD [id number] Yes 52 mg by intrauterine route 1 time. Historical Provider, MD Taking Active                   Assessment/Plan    1) leukocytosis  -referred in 2022  -flow cytometry was negative  -FISH for BCR-ABL negative  -myeloid NGS was negative  -iron deficiency ruled out  -nothing suspicious or suggestive of malignancy has been found  -probably reactive as predominantly neutrophilic  -labs done on 9/27/2024 included CBC + iron panel + ferritin  -results reviewed--wbc 17.7, hgb 13.3, plt 427,000, ANC 11,630, abs lymph 4500, abs monos 1220  -obesity/high BMI can be associated with leukocytosis--obesity is associated with chronic low grade inflammatory state that causes leukocytosis due to an increase in fatty acids and products of adipocyte cell death; cytokines like IL-6 and IL-8 are elevated in obesity and lead to leukocytosis; leptin promotes the differentiation of granulocytes from hemopoietic progenitor cells, which also leads to leukocytosis; obesity can accelerate the release of neutrophils from the bone marrow and enhance granulopoiesis  -will continue to see her annually    2) prediabetes  -declined metformin  -trying to lose weight  -dietary modifications    3) heavy menses  -on mirena     Problem List Items Addressed This Visit  "            ICD-10-CM    Leukocytosis - Primary D72.829    Relevant Orders    Clinic Appointment Request Follow Up; BENNETT ANDINO; [id number]    CBC and Auto Differential    Comprehensive metabolic panel    Iron and TIBC    Ferritin            Bennett Andino MD

## 2025-01-27 ENCOUNTER — PATIENT MESSAGE (OUTPATIENT)
Dept: PRIMARY CARE | Facility: CLINIC | Age: 30
End: 2025-01-27
Payer: COMMERCIAL

## 2025-01-27 DIAGNOSIS — R73.03 PRE-DIABETES: ICD-10-CM

## 2025-01-27 DIAGNOSIS — E88.810 METABOLIC SYNDROME: ICD-10-CM

## 2025-01-30 ENCOUNTER — APPOINTMENT (OUTPATIENT)
Dept: PRIMARY CARE | Facility: CLINIC | Age: 30
End: 2025-01-30
Payer: COMMERCIAL

## 2025-03-19 ENCOUNTER — APPOINTMENT (OUTPATIENT)
Dept: PRIMARY CARE | Facility: CLINIC | Age: 30
End: 2025-03-19
Payer: COMMERCIAL

## 2025-06-05 ENCOUNTER — APPOINTMENT (OUTPATIENT)
Dept: PRIMARY CARE | Facility: CLINIC | Age: 30
End: 2025-06-05
Payer: COMMERCIAL

## 2025-06-21 LAB
ALBUMIN SERPL-MCNC: 4 G/DL (ref 3.6–5.1)
ALP SERPL-CCNC: 91 U/L (ref 31–125)
ALT SERPL-CCNC: 11 U/L (ref 6–29)
ANION GAP SERPL CALCULATED.4IONS-SCNC: 7 MMOL/L (CALC) (ref 7–17)
AST SERPL-CCNC: 11 U/L (ref 10–30)
BASOPHILS # BLD AUTO: 59 CELLS/UL (ref 0–200)
BASOPHILS NFR BLD AUTO: 0.4 %
BILIRUB SERPL-MCNC: 0.3 MG/DL (ref 0.2–1.2)
BUN SERPL-MCNC: 13 MG/DL (ref 7–25)
CALCIUM SERPL-MCNC: 9.3 MG/DL (ref 8.6–10.2)
CHLORIDE SERPL-SCNC: 100 MMOL/L (ref 98–110)
CHOLEST SERPL-MCNC: 191 MG/DL
CHOLEST/HDLC SERPL: 6.4 (CALC)
CO2 SERPL-SCNC: 30 MMOL/L (ref 20–32)
CREAT SERPL-MCNC: 0.69 MG/DL (ref 0.5–0.97)
EGFRCR SERPLBLD CKD-EPI 2021: 120 ML/MIN/1.73M2
EOSINOPHIL # BLD AUTO: 294 CELLS/UL (ref 15–500)
EOSINOPHIL NFR BLD AUTO: 2 %
ERYTHROCYTE [DISTWIDTH] IN BLOOD BY AUTOMATED COUNT: 13 % (ref 11–15)
EST. AVERAGE GLUCOSE BLD GHB EST-MCNC: 151 MG/DL
EST. AVERAGE GLUCOSE BLD GHB EST-SCNC: 8.4 MMOL/L
GLUCOSE SERPL-MCNC: 92 MG/DL (ref 65–139)
HBA1C MFR BLD: 6.9 %
HCT VFR BLD AUTO: 41.2 % (ref 35–45)
HDLC SERPL-MCNC: 30 MG/DL
HGB BLD-MCNC: 13.1 G/DL (ref 11.7–15.5)
LDLC SERPL CALC-MCNC: 113 MG/DL (CALC)
LYMPHOCYTES # BLD AUTO: 3690 CELLS/UL (ref 850–3900)
LYMPHOCYTES NFR BLD AUTO: 25.1 %
MCH RBC QN AUTO: 27.1 PG (ref 27–33)
MCHC RBC AUTO-ENTMCNC: 31.8 G/DL (ref 32–36)
MCV RBC AUTO: 85.3 FL (ref 80–100)
MONOCYTES # BLD AUTO: 1044 CELLS/UL (ref 200–950)
MONOCYTES NFR BLD AUTO: 7.1 %
NEUTROPHILS # BLD AUTO: 9614 CELLS/UL (ref 1500–7800)
NEUTROPHILS NFR BLD AUTO: 65.4 %
NONHDLC SERPL-MCNC: 161 MG/DL (CALC)
PLATELET # BLD AUTO: 484 THOUSAND/UL (ref 140–400)
PMV BLD REES-ECKER: 8.9 FL (ref 7.5–12.5)
POTASSIUM SERPL-SCNC: 4.3 MMOL/L (ref 3.5–5.3)
PROT SERPL-MCNC: 7.3 G/DL (ref 6.1–8.1)
RBC # BLD AUTO: 4.83 MILLION/UL (ref 3.8–5.1)
SODIUM SERPL-SCNC: 137 MMOL/L (ref 135–146)
TRIGL SERPL-MCNC: 336 MG/DL
WBC # BLD AUTO: 14.7 THOUSAND/UL (ref 3.8–10.8)

## 2025-06-25 ENCOUNTER — APPOINTMENT (OUTPATIENT)
Dept: PRIMARY CARE | Facility: CLINIC | Age: 30
End: 2025-06-25
Payer: COMMERCIAL

## 2025-06-25 VITALS
DIASTOLIC BLOOD PRESSURE: 72 MMHG | WEIGHT: 293 LBS | OXYGEN SATURATION: 96 % | SYSTOLIC BLOOD PRESSURE: 108 MMHG | HEART RATE: 108 BPM | BODY MASS INDEX: 57.63 KG/M2 | TEMPERATURE: 97.7 F

## 2025-06-25 DIAGNOSIS — E66.813 CLASS 3 SEVERE OBESITY DUE TO EXCESS CALORIES WITH SERIOUS COMORBIDITY AND BODY MASS INDEX (BMI) OF 50.0 TO 59.9 IN ADULT: ICD-10-CM

## 2025-06-25 DIAGNOSIS — E11.9 TYPE 2 DIABETES MELLITUS WITHOUT COMPLICATION, WITHOUT LONG-TERM CURRENT USE OF INSULIN: Primary | ICD-10-CM

## 2025-06-25 PROCEDURE — 3078F DIAST BP <80 MM HG: CPT | Performed by: FAMILY MEDICINE

## 2025-06-25 PROCEDURE — 99214 OFFICE O/P EST MOD 30 MIN: CPT | Performed by: FAMILY MEDICINE

## 2025-06-25 PROCEDURE — 3074F SYST BP LT 130 MM HG: CPT | Performed by: FAMILY MEDICINE

## 2025-06-25 RX ORDER — LANCETS
1 EACH MISCELLANEOUS DAILY
Qty: 100 EACH | Refills: 3 | Status: SHIPPED | OUTPATIENT
Start: 2025-06-25

## 2025-06-25 RX ORDER — INSULIN PUMP SYRINGE, 3 ML
1 EACH MISCELLANEOUS 3 TIMES DAILY PRN
Qty: 1 KIT | Refills: 0 | Status: SHIPPED | OUTPATIENT
Start: 2025-06-25 | End: 2025-06-26

## 2025-06-25 RX ORDER — TIRZEPATIDE 2.5 MG/.5ML
2.5 INJECTION, SOLUTION SUBCUTANEOUS WEEKLY
Qty: 2 ML | Refills: 1 | Status: SHIPPED | OUTPATIENT
Start: 2025-06-25 | End: 2025-06-27 | Stop reason: SDUPTHER

## 2025-06-25 RX ORDER — ISOPROPYL ALCOHOL 70 ML/100ML
1 SWAB TOPICAL 2 TIMES DAILY PRN
Qty: 200 EACH | Refills: 3 | Status: SHIPPED | OUTPATIENT
Start: 2025-06-25

## 2025-06-25 ASSESSMENT — PATIENT HEALTH QUESTIONNAIRE - PHQ9
1. LITTLE INTEREST OR PLEASURE IN DOING THINGS: NOT AT ALL
2. FEELING DOWN, DEPRESSED OR HOPELESS: NOT AT ALL
SUM OF ALL RESPONSES TO PHQ9 QUESTIONS 1 AND 2: 0

## 2025-06-25 NOTE — PROGRESS NOTES
Subjective   Patient ID: Muriel Carranza is a 30 y.o. female who presents for Follow-up (On pre-diabetes).    HPI   Here today for follow-up recent testing results.  Most recent A1c 6.9, now consistent with new diagnosis of type 2 diabetes.  Since receiving results, has been trying to make food choices.  Denies polyuria, polydipsia, polyphagia.    Review of Systems   Constitutional:  Negative for activity change and fatigue.   Respiratory:  Negative for chest tightness and shortness of breath.    Cardiovascular:  Negative for chest pain, palpitations and leg swelling.   Gastrointestinal:  Negative for abdominal pain, nausea and vomiting.   Endocrine: Negative for polydipsia, polyphagia and polyuria.   Neurological:  Negative for headaches.       Objective   /72 (BP Location: Left arm, Patient Position: Sitting)   Pulse 108   Temp 36.5 °C (97.7 °F) (Skin)   Wt 138 kg (305 lb)   SpO2 96%   BMI 57.63 kg/m²     Physical Exam  Vitals reviewed.   Constitutional:       General: She is not in acute distress.     Appearance: Normal appearance. She is obese. She is not ill-appearing.   HENT:      Head: Normocephalic and atraumatic.      Mouth/Throat:      Mouth: Mucous membranes are moist.   Eyes:      Extraocular Movements: Extraocular movements intact.      Conjunctiva/sclera: Conjunctivae normal.   Neck:      Thyroid: No thyroid mass, thyromegaly or thyroid tenderness.   Cardiovascular:      Rate and Rhythm: Normal rate and regular rhythm.      Pulses: Normal pulses.      Heart sounds: Normal heart sounds. No murmur heard.  Pulmonary:      Effort: Pulmonary effort is normal.      Breath sounds: Normal breath sounds.   Abdominal:      Palpations: Abdomen is soft.      Tenderness: There is no abdominal tenderness.   Musculoskeletal:      Cervical back: Neck supple.      Right lower leg: No edema.      Left lower leg: No edema.   Lymphadenopathy:      Cervical: No cervical adenopathy.   Skin:     General: Skin  is warm and dry.   Neurological:      Mental Status: She is alert and oriented to person, place, and time. Mental status is at baseline.   Psychiatric:         Mood and Affect: Mood normal.         Behavior: Behavior normal.         Thought Content: Thought content normal.       Lab Results   Component Value Date    WBC 14.7 (H) 06/20/2025    HGB 13.1 06/20/2025    HCT 41.2 06/20/2025     (H) 06/20/2025    CHOL 191 06/20/2025    TRIG 336 (H) 06/20/2025    HDL 30 (L) 06/20/2025    ALT 11 06/20/2025    AST 11 06/20/2025     06/20/2025    K 4.3 06/20/2025     06/20/2025    CREATININE 0.69 06/20/2025    BUN 13 06/20/2025    CO2 30 06/20/2025    TSH 2.60 08/04/2023    HGBA1C 6.9 (H) 06/20/2025     par    Assessment/Plan   Assessment & Plan  Type 2 diabetes mellitus without complication, without long-term current use of insulin  Recent labs reviewed in detail  She would benefit from initiation of GLP-1 agonist therapy for both glucose control as well as potential for assisting with weight management efforts.  Start tirzepatide 2.5 mg weekly.  Confirmed no personal or family history of M EN type II syndrome or medullary thyroid cancer.  No personal history of pancreatitis.  Stressed the importance of healthy food choices, monitoring portion sizes, and regular physical activity with weightbearing activity to minimize adverse effects.  We discussed role of ACE inhibitor/ARB therapy for renal protective effects.  Will consider this at follow-up visit after obtaining urine microalbumin  Reviewed role of statin therapy and cardiovascular risk reduction.  Will reassess lipids after dietary modifications.  Recommend diabetic eye exam  Meet with nutrition to facilitate diet modifications to improve glucose control/weight management  Orders:    Referral to Nutrition Services; Future    Referral to Ophthalmology; Future    Blood glucose monitoring meter; 1 each 3 times a day as needed (for low blood sugar  symptoms) for up to 1 day.    blood sugar diagnostic; 1 each once daily.    lancets misc; 1 each once daily.    alcohol swabs; Apply 1 Swab topically 2 times a day as needed (glucose check or med aministration).    Class 3 severe obesity due to excess calories with serious comorbidity and body mass index (BMI) of 50.0 to 59.9 in adult    Orders:    Referral to Nutrition Services; Future

## 2025-06-27 ENCOUNTER — PATIENT MESSAGE (OUTPATIENT)
Dept: PRIMARY CARE | Facility: CLINIC | Age: 30
End: 2025-06-27
Payer: COMMERCIAL

## 2025-06-27 DIAGNOSIS — E11.9 TYPE 2 DIABETES MELLITUS WITHOUT COMPLICATION, WITHOUT LONG-TERM CURRENT USE OF INSULIN: ICD-10-CM

## 2025-06-27 RX ORDER — TIRZEPATIDE 2.5 MG/.5ML
2.5 INJECTION, SOLUTION SUBCUTANEOUS WEEKLY
Qty: 2 ML | Refills: 1 | Status: SHIPPED | OUTPATIENT
Start: 2025-06-27

## 2025-06-28 ASSESSMENT — ENCOUNTER SYMPTOMS
ACTIVITY CHANGE: 0
PALPITATIONS: 0
FATIGUE: 0
HEADACHES: 0
NAUSEA: 0
POLYDIPSIA: 0
POLYPHAGIA: 0
CHEST TIGHTNESS: 0
SHORTNESS OF BREATH: 0
ABDOMINAL PAIN: 0
VOMITING: 0

## 2025-08-18 ASSESSMENT — ENCOUNTER SYMPTOMS
POLYDIPSIA: 0
WEIGHT LOSS: 0
HUNGER: 0
BLACKOUTS: 0
TREMORS: 1
VISUAL CHANGE: 0
SEIZURES: 0
HEADACHES: 0
BLURRED VISION: 0
FATIGUE: 0
POLYPHAGIA: 0
SWEATS: 0
DIZZINESS: 1
CONFUSION: 0
WEAKNESS: 0
SPEECH DIFFICULTY: 0
NERVOUS/ANXIOUS: 1

## 2025-08-25 ENCOUNTER — APPOINTMENT (OUTPATIENT)
Dept: PRIMARY CARE | Facility: CLINIC | Age: 30
End: 2025-08-25
Payer: COMMERCIAL

## 2025-08-25 VITALS
SYSTOLIC BLOOD PRESSURE: 142 MMHG | WEIGHT: 293 LBS | HEART RATE: 86 BPM | HEIGHT: 61 IN | TEMPERATURE: 97.9 F | BODY MASS INDEX: 55.32 KG/M2 | DIASTOLIC BLOOD PRESSURE: 90 MMHG

## 2025-08-25 DIAGNOSIS — E11.9 TYPE 2 DIABETES MELLITUS WITHOUT COMPLICATION, WITHOUT LONG-TERM CURRENT USE OF INSULIN: ICD-10-CM

## 2025-08-25 DIAGNOSIS — E66.813 CLASS 3 SEVERE OBESITY DUE TO EXCESS CALORIES WITH SERIOUS COMORBIDITY AND BODY MASS INDEX (BMI) OF 50.0 TO 59.9 IN ADULT: ICD-10-CM

## 2025-08-25 DIAGNOSIS — Z00.00 ADULT GENERAL MEDICAL EXAM: Primary | ICD-10-CM

## 2025-08-25 PROCEDURE — 3077F SYST BP >= 140 MM HG: CPT | Performed by: FAMILY MEDICINE

## 2025-08-25 PROCEDURE — 1036F TOBACCO NON-USER: CPT | Performed by: FAMILY MEDICINE

## 2025-08-25 PROCEDURE — 99395 PREV VISIT EST AGE 18-39: CPT | Performed by: FAMILY MEDICINE

## 2025-08-25 PROCEDURE — 3080F DIAST BP >= 90 MM HG: CPT | Performed by: FAMILY MEDICINE

## 2025-08-25 PROCEDURE — 3008F BODY MASS INDEX DOCD: CPT | Performed by: FAMILY MEDICINE

## 2025-08-25 ASSESSMENT — ENCOUNTER SYMPTOMS
CONFUSION: 0
SWEATS: 0
POLYDIPSIA: 0
SPEECH DIFFICULTY: 0
WEAKNESS: 0
HUNGER: 0
POLYPHAGIA: 0
FATIGUE: 0
TREMORS: 1
SEIZURES: 0
WEIGHT LOSS: 0
VISUAL CHANGE: 0
BLACKOUTS: 0
HEADACHES: 0
DIZZINESS: 1
NERVOUS/ANXIOUS: 1
BLURRED VISION: 0

## 2025-11-04 ENCOUNTER — APPOINTMENT (OUTPATIENT)
Dept: OPHTHALMOLOGY | Facility: CLINIC | Age: 30
End: 2025-11-04
Payer: COMMERCIAL

## 2025-12-11 ENCOUNTER — APPOINTMENT (OUTPATIENT)
Dept: PRIMARY CARE | Facility: CLINIC | Age: 30
End: 2025-12-11
Payer: COMMERCIAL